# Patient Record
Sex: MALE | Race: OTHER | URBAN - METROPOLITAN AREA
[De-identification: names, ages, dates, MRNs, and addresses within clinical notes are randomized per-mention and may not be internally consistent; named-entity substitution may affect disease eponyms.]

---

## 2024-06-14 ENCOUNTER — APPOINTMENT (OUTPATIENT)
Dept: OTHER | Facility: HOSPITAL | Age: 29
End: 2024-06-14
Attending: EMERGENCY MEDICINE

## 2024-06-14 ENCOUNTER — OFFICE VISIT (OUTPATIENT)
Dept: OTHER | Facility: HOSPITAL | Age: 29
End: 2024-06-14
Attending: EMERGENCY MEDICINE

## 2024-06-21 ENCOUNTER — OFFICE VISIT (OUTPATIENT)
Dept: OTHER | Facility: HOSPITAL | Age: 29
End: 2024-06-21
Attending: EMERGENCY MEDICINE

## 2024-06-21 DIAGNOSIS — R52 PAIN: Primary | ICD-10-CM

## 2024-06-26 ENCOUNTER — TELEPHONE (OUTPATIENT)
Dept: PHYSICAL THERAPY | Facility: HOSPITAL | Age: 29
End: 2024-06-26

## 2024-06-27 ENCOUNTER — APPOINTMENT (OUTPATIENT)
Dept: PHYSICAL THERAPY | Age: 29
End: 2024-06-27
Attending: EMERGENCY MEDICINE
Payer: OTHER MISCELLANEOUS

## 2024-06-27 ENCOUNTER — TELEPHONE (OUTPATIENT)
Dept: PHYSICAL THERAPY | Facility: HOSPITAL | Age: 29
End: 2024-06-27

## 2024-07-08 ENCOUNTER — OFFICE VISIT (OUTPATIENT)
Dept: PHYSICAL THERAPY | Age: 29
End: 2024-07-08
Attending: EMERGENCY MEDICINE
Payer: OTHER MISCELLANEOUS

## 2024-07-08 DIAGNOSIS — R52 PAIN: Primary | ICD-10-CM

## 2024-07-08 PROCEDURE — 97161 PT EVAL LOW COMPLEX 20 MIN: CPT

## 2024-07-08 PROCEDURE — 97014 ELECTRIC STIMULATION THERAPY: CPT

## 2024-07-08 PROCEDURE — 97110 THERAPEUTIC EXERCISES: CPT

## 2024-07-08 NOTE — PROGRESS NOTES
PHYSICAL THERAPY EVALUATION:   Referring Physician: Dr. Holden  Diagnosis:  Pain (R52)       Low back pain  Date of Service: 7/8/2024   Date of Order: 6/20/24    Patient verbally consented to be seen for PT evaluation and treatment  Precautions:  None    PATIENT SUMMARY    Adriana Holbrook is a 29 year old male who presents to therapy today with complaints pain on the R side low back . Pain is constant but worst with bending, sitting with cross legged.Pt describes pain level current 5/10, at best 3/10, at worst 6-7/10. . Pt states that pain is better with sleeping.pain is stabbing nature :    Current functional limitations include reported pain and difficulty with :hygiene in the bathroom: twisting, bending, sitting cross legged, not lifting anything, swimming    History of current condition/ Previous episodes/treatments and results: Pt relates he injured himself at work he injured himself at work when he was lifting a box of fish. Then he felt the pain immediately on the R side and tried to work and complete shift  but the pain remained until the next day. So he informed his supervisor went to see MD and was given medication, getting better each day. No previous issues       Adriana describes prior level of function pt was pain free and is able to play basketball, .     Social  History/Occupation: : working FT and physical work, not working at this time    Pt goals include pt wishes to get exercises to help him , no pain.    Past medical history was reviewed with Adriana.   Significant findings include No past medical history on file.          ASSESSMENT  Adriana presents to physical therapy evaluation with primary c/o pain on the R side low back. Adriana presented with no co-morbidities .    Adriana reports functional deficits include but are not limited to pain and difficulty with :hygiene in the bathroom: twisting, bending, sitting cross legged, not lifting anything, swimming.      The results of the objective  tests and measures as noted below show  impairments of  joint mobility, motor function, muscle performance, muscle endurance, range of motion, coordination, and balance significantly affecting :lumbopelvic  Pt has decreased lumbar mobility with flexion direction  preference noted with evaluation.       Ujwal  has impaired posture.    Ujwal has no noted gait deviations and no reported  compensation when managing stairs. Transitional transfers are not affected by pain .   Pt is positive  for tenderness/hypertonicity  noted on paraspinals area  upon palpation .Hamstring flexibility decreased on B LE.   Pt noted to have impaired  proprioception per SLS on 30 secs but pain on RLE .     Pt and PT discussed evaluation findings, pathology, POC and HEP.    Pt voiced understanding and performs HEP correctly without reported pain.  Instructions were provided on how to modify as need or when to stop.    Skilled Physical Therapy is medically necessary to address the above impairments and reach functional goals to go back to prior level of function with minimal to no compensation, with improved ability to manage and decrease symptoms and pain when performing daily/work and community tasks.      OBJECTIVE:   Observation/Transfers I  Posture:slouched posture  Gait:  pt ambulates on level ground with normal mechanics  Stairs:normal  Palpation: 2/4 tenderness on R LS area      ROM:(*)  pain  in flexion planes  % loss as noted:   With limited lumbar  flexion 25%,   Lumbar extension :25%  B lateral flexion :0%  B rotation :0 %         Sustained motions:  Sustained slouch:NE  Sustained overcorrect:NE  Pronelying:NE  DEJAN:pain then increased pain with repeated DEJAN  SKTC:better  EIL:midrange NT     right left Comments    Motor Control Motor Control    Double Leg Squat WNL WNL    Single Leg Squat Decreased Decreased    Single Leg Balance 30 sec 30 sec Painful on the R side     FLEXIBILITY: Supine 90/90 test for hamstring R:min loss versus  L:min loss    LE ROM AND STRENGTH:   BLE major joints of hip/knee and ankle are WFL, pain free       MMT for   B hip muscles grossly graded  5/5 in major muscles  B knee and ankle DF grossly 5/5    Today’s Treatment and Response:   .Discussion proper/upright posturing and how to decrease symptoms/centralize symptoms     SKTC 20 secs x5  Diaphragmatic breathing  Tra facilitation   TRA marches  Tra with bridges  6. Tra with hip abd RTB x10  7, supine hip adduction  5 secs x10    Pt education was provided on exam findings, treatment diagnosis, treatment plan, expectations, and prognosis. Pt was also provided recommendations for activity modifications, possible soreness after evaluation, and IFC rationale .  Patient was instructed in and issued a HEP for: 1-7    Charges: PT Eval Low Complexity, thereax x1 (10 mins), IFC x1      Total Timed Treatment: 10 min     Total Treatment Time: 55 min     Based on clinical rationale and outcome measures, this evaluation involved Low Complexity decision making due to 1-2 personal factors/comorbidities, 0 body structures involved/activity limitations, and evolving symptoms including changing pain levels.    PLAN OF CARE:    Goals: to be met in 10-12 visits then will reassess     Pt will be I with HEP,its progression and management of symptoms and be I with self correction of upright posturing to continue with gains in therapy.   Pt will demonstrate improved AROM in all planes of lumbar motion to WFL, pain and symptoms free to be able lift with no issues   Pt will report overall decreased in pain and symptoms and functional limitations  by 50% or better to be able to PLOF  Pt will demonstrate good understanding of proper posture and body mechanics to decrease pain and improve spinal safety     Pt will have decreased mm tension/hypertonicity on R LS to minimal to none in order to safely go back to work   Pt will demonstrate biomechanical strategies for functional transfers, floor to  waist lifting etc to minimize risk of injuries:75% of the time with minimal cues.    Frequency / Duration: Patient will be seen for 1-2 x/week or a total of 10-12 visits over a 90 day period. Frequency may be changed as appropriate  Treatment plan includes:  Manual Therapy; Therapeutic Exercises; Neuromuscular Re-education; Therapeutic Activity; Gait Training; Patient education; Home exercise program instruction,self care home management,  Modalities as needed:Electrical stimulation (unattended) and Ultrasound    Education or treatment limitation: None  Rehab Potential:good     OSWESTRY: Oswestry Disability Index Score  Score: 24 % (7/8/2024  1:25 PM)       Patient/Family/Caregiver was advised of these findings, precautions, and treatment options and has agreed to actively participate in planning and for this course of care.    Thank you for your referral. Please co-sign or sign and return this letter via fax as soon as possible to [phone]. If you have any questions, please contact me at Dept: [phone]    Sincerely,  Electronically signed by therapist: Gardenia Moyer,PT,DPT,CAPP-OB  Physician's certification required: Yes  I certify the need for these services furnished under this plan of treatment and while under my care.    ___________________________________________________ Date____________________    Certification From: 7/8/2024  To:10/6/2024

## 2024-07-10 ENCOUNTER — APPOINTMENT (OUTPATIENT)
Dept: OTHER | Facility: HOSPITAL | Age: 29
End: 2024-07-10
Attending: EMERGENCY MEDICINE

## 2024-07-10 ENCOUNTER — OFFICE VISIT (OUTPATIENT)
Dept: PHYSICAL THERAPY | Age: 29
End: 2024-07-10
Attending: EMERGENCY MEDICINE
Payer: OTHER MISCELLANEOUS

## 2024-07-10 PROCEDURE — 97112 NEUROMUSCULAR REEDUCATION: CPT

## 2024-07-10 PROCEDURE — 97110 THERAPEUTIC EXERCISES: CPT

## 2024-07-10 PROCEDURE — 97014 ELECTRIC STIMULATION THERAPY: CPT

## 2024-07-10 NOTE — PROGRESS NOTES
Dx: Pain (R52)       Low back pain            Insurance   work man's comp           Authorized  # visits by insurance  :  12    Expiration date  of Authorization:10/6/24    Eval date/latest PN:7/8/24  Initial POC# of visits: 12                POC cert : 10/6/24    Authorizing Physician: Dr. Holden    Fall Risk: standard         Precautions: none         Subjective: Pt states that he is the same. Pt states that he went to see MD and gasperw could only work with restrictsions  PAIN LEVEL:2/10  Assessment:      PT continued with challenging pt in performing exercises in  different positions / amount of resistance applied/ within new ROM gains  in order to work towards goals      PT continued with IFC due to reported decreased in pain level.     Additional HEP given to continue with progress gained in therapy. Tactile,verbal and written cues given for proper performance. Pt voiced understanding and performs HEP which  correctly without reported pain.Instructions were provided on how to modify as need or when to stop.      See updated objective reports noted below,        See exercises as logged below     Objective:     ROM:(*)  pain  in flexion planes  % loss as noted:               With limited lumbar  flexion 25%,   Lumbar extension :25%  B lateral flexion :0%  B rotation :0 %    Goals:   goals addressed this day as noted above    Goals: to be met in 10-12 visits then will reassess      Pt will be I with HEP,its progression and management of symptoms and be I with self correction of upright posturing to continue with gains in therapy.   Pt will demonstrate improved AROM in all planes of lumbar motion to WFL, pain and symptoms free to be able lift with no issues   Pt will report overall decreased in pain and symptoms and functional limitations  by 50% or better to be able to PLOF  Pt will demonstrate good understanding of proper posture and body mechanics to decrease pain and improve spinal safety      Pt will have  decreased mm tension/hypertonicity on R LS to minimal to none in order to safely go back to work   Pt will demonstrate biomechanical strategies for functional transfers, floor to waist lifting etc to minimize risk of injuries:75% of the time with minimal cues.     Plan: cont per POC     Date: 7/10/24  TX#: 2/12 Date:              TX#: 3/ Date:        TX#: 4/ Date:               TX#: 5/   Date:   Tx#: 6/   Theraex: 30  NU step level x6 mins level 5  DEJAN x2 mins  DEJAN 2x10  Prone hip extension 2x10  SLR 2x10  SL hip abd 2x10  SL clams 2x10  NWB lumbar rotation x10 secs x10       Manual:        Gait/NMRed: 10  mins  Step up 2x10 forward and sideways 6 inches    SLS for load transfers 10 secs x5       Modalities IFC:R LS area x15 mins with ice 1-150hz         HEP see patient instructions tab if with new HEP see patient instructions tab if with new HEP see patient instructions tab if with new HEP see patient instructions tab if with new HEP see patient instructions tab if with new HEP          Charges: thereaex 2 , neuro pati x1  ,IFC x1      Total Timed Treatment:  38min  Total Treatment Time: 53 min

## 2024-07-17 ENCOUNTER — TELEPHONE (OUTPATIENT)
Dept: PHYSICAL THERAPY | Facility: HOSPITAL | Age: 29
End: 2024-07-17

## 2024-07-19 NOTE — PROGRESS NOTES
Dx: Pain (R52)       Low back pain            Insurance   work man's comp           Authorized  # visits by insurance  :  12    Expiration date  of Authorization:10/6/24    Eval date/latest PN:7/8/24  Initial POC# of visits: 12                POC cert : 10/6/24    Authorizing Physician: Dr. Holden    Fall Risk: standard         Precautions: none         Subjective: pt states that he is better, He is back by 99% , worst pain when he sits after 5 mins.Pt states that he is not back to work. He will see MD and follow advise  PAIN LEVEL:0/10  Assessment:      PT continued with challenging pt in performing exercises in  different positions / amount of resistance applied/ within new ROM gains  in order to work towards goals : loaded pt this day with resisted standing, some soreness noted on B hip mm     PT continued with IFC due to reported decreased in pain level.     See updated objective reports noted below,        See exercises as logged below     Objective:     ROM:(*)  pain  in flexion planes  % loss as noted:               With limited lumbar  flexion 25%,   Lumbar extension :25%  B lateral flexion :0%  B rotation :0 %    Goals:   goals addressed this day as noted above    Goals: to be met in 10-12 visits then will reassess      Pt will be I with HEP,its progression and management of symptoms and be I with self correction of upright posturing to continue with gains in therapy.: reports compliance   Pt will demonstrate improved AROM in all planes of lumbar motion to WFL, pain and symptoms free to be able lift with no issues:improved   Pt will report overall decreased in pain and symptoms and functional limitations  by 50% or better to be able to PLOF: improved: 99% PLOF to this date  Pt will demonstrate good understanding of proper posture and body mechanics to decrease pain and improve spinal safety : in progress     Pt will have decreased mm tension/hypertonicity on R LS to minimal to none in order to safely go back  to work : improved  Pt will demonstrate biomechanical strategies for functional transfers, floor to waist lifting etc to minimize risk of injuries:75% of the time with minimal cues.     Plan: cont per POC but does not want to schedule more at this time and      Date: 7/10/24  TX#: 2/12 Date:      7/23/24  TX#: 3/ Date:        TX#: 4/ Date:               TX#: 5/   Date:   Tx#: 6/   Theraex: 30  NU step level x6 mins level 5  DEJAN x2 mins  DEJAN 2x10  Prone hip extension 2x10  SLR 2x10  SL hip abd 2x10  SL clams 2x10  NWB lumbar rotation x10 secs x10 X38 mins  NU step level x6 mins level 5   Lumbar extension 2x10  Standing hip exercises x10 x3 planes with no resistance then with YTB x10    Shuttle BLE 4 bands 2x10  \shuttle R/ LLE 2 [id number]  NWB lumbar rotation x10 secs x10  Bridges 5 x15      Manual:        Gait/NMRed:        Modalities IFC:R LS area x15 mins with ice 1-150hz   IFC:R LS area x15 mins with ice 1-150hz      HEP see patient instructions tab if with new HEP see patient instructions tab if with new HEP   See tab see patient instructions tab if with new HEP see patient instructions tab if with new HEP see patient instructions tab if with new HEP          Charges: thereaex 3   ,IFC x1      Total Timed Treatment:  38min  Total Treatment Time: 53 min

## 2024-07-23 ENCOUNTER — OFFICE VISIT (OUTPATIENT)
Dept: PHYSICAL THERAPY | Age: 29
End: 2024-07-23
Attending: EMERGENCY MEDICINE
Payer: OTHER MISCELLANEOUS

## 2024-07-23 PROCEDURE — 97014 ELECTRIC STIMULATION THERAPY: CPT

## 2024-07-23 PROCEDURE — 97110 THERAPEUTIC EXERCISES: CPT

## 2024-07-24 ENCOUNTER — OFFICE VISIT (OUTPATIENT)
Dept: PHYSICAL MEDICINE AND REHAB | Facility: CLINIC | Age: 29
End: 2024-07-24
Payer: OTHER MISCELLANEOUS

## 2024-07-24 ENCOUNTER — HOSPITAL ENCOUNTER (OUTPATIENT)
Dept: GENERAL RADIOLOGY | Facility: HOSPITAL | Age: 29
Discharge: HOME | End: 2024-07-24
Attending: PHYSICAL MEDICINE & REHABILITATION
Payer: OTHER MISCELLANEOUS

## 2024-07-24 VITALS — BODY MASS INDEX: 22.44 KG/M2 | HEIGHT: 67 IN | WEIGHT: 143 LBS

## 2024-07-24 DIAGNOSIS — M47.816 LUMBAR FACET JOINT SYNDROME: ICD-10-CM

## 2024-07-24 DIAGNOSIS — M47.816 LUMBAR FACET JOINT SYNDROME: Primary | ICD-10-CM

## 2024-07-24 PROCEDURE — 72114 X-RAY EXAM L-S SPINE BENDING: CPT | Performed by: PHYSICAL MEDICINE & REHABILITATION

## 2024-07-24 PROCEDURE — 99204 OFFICE O/P NEW MOD 45 MIN: CPT | Performed by: PHYSICAL MEDICINE & REHABILITATION

## 2024-07-24 NOTE — PATIENT INSTRUCTIONS
Return back to work full duty no restrictions  X-ray of the lumbar spine on the way out today  Meloxicam 15 mg daily for the next 7 to 10 days  Finish PT and continue home exercises  Topical treatment as tolerated  Follow-up in 4-week

## 2024-07-24 NOTE — PROGRESS NOTES
[facility]  NEW PATIENT EVALUATION    Consultation as a request of Dr. Purvis ref. provider found      HISTORY OF PRESENT ILLNESS:     Chief Complaint   Patient presents with    New Patient     New left hand dominant patient presents for low back pain. Patient states he lifted a heavy tuna fish at work and felt pain instantly. Pain has been present for 1 month. Pain 2/10. Admits N/T on the right low back. Denies weakness. Patient is in PT with good relief. No pain meds. No recent imaging.        The patient is a 29 year old male with no significant past medical history who presents with back pain.  States he had a work-related injury closer to June 25th but cannot recall the exact date.  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  He was lifting something when he felt a sharp pain.  States the pain was bad enough that he could not work.  He was seen in occupational health and has been doing physical therapy for the last month.  He has noted good improvement in his pain.  States he can stand and walk and ambulate without any significant discomfort however if he tries to lift something heavy he does feel some pain and numbness in the right-sided low back.  No pain radiating down the leg, no numbness tingling, no weakness, no bowel bladder issues patient states he was trying to work with some restrictions but needed to be at full duty in order to return back.    PHYSICAL EXAM:   Ht 67\"   Wt 143 lb (64.9 kg)   BMI 22.40 kg/m²     Gait  Able to toe walk and heel walk without any difficulty    LUMBAR SPINE:  Inspection: no erythema, swelling, or obvious deformity.  Their iliac crest and shoulder heights are symmetrical.     Palpation: Non tender to palpation of the spinous process. T palpation of the right lower lumbar PSIS and paraspinal muscle  ROM: FAROM  Strength: 5/5 in bilateral lower extremities  Sensation: Intact to light touch in all  dermatomes of the lower extremities  Reflexes: 2/4 at L4 and S1  Facet Loading: no specific facet pain  Straight leg raise: negative for radicular pain symptoms  Slump test: negative for pain symptoms for radicular pain symptoms      IMAGING:     None    All imaging results were reviewed and discussed with patient.      ASSESSMENT/PLAN:     1. Lumbar facet joint syndrome        Adriana Holbrook is a pleasant 29-year-old male presenting today for evaluation of right-sided low back pain which I believe he may have some stress response either in the pedicle or the facet joint may even have possibly a pars stress injury.  I recommended x-ray imaging to rule this out.  Recommend that he return back to work full duty no restrictions as his pain is improved greatly and he has a good neurological exam today.  Recommend he use meloxicam 15 mg daily for the next 7 to 10 days and to complete his physical therapy program and continue home exercises.  He will follow-up with me in 4 weeks in the office if pain is no better we will discuss further treatment options.      The patient verbalized understanding with the plan and was in agreement. All questions/concerns were addressed and there were no barriers to learning.  Please note Dragon dictation software was used to dictate this note and may result in inadvertent typos.    Maria Del Carmen Russell DO, FAAPMR & CAQSM  Physical Medicine and Rehabilitation  Sports and Spine Medicine    PAST MEDICAL HISTORY:   No past medical history on file.      PAST SURGICAL HISTORY:   No past surgical history on file.      CURRENT MEDICATIONS:     No current outpatient medications on file.         ALLERGIES:   No Known Allergies      FAMILY HISTORY:   No family history on file.       SOCIAL HISTORY:     Social History     Socioeconomic History    Marital status: Single          REVIEW OF SYSTEMS:   No patient-reported data collected this visit.      PHYSICAL EXAM:   General: No immediate distress  Head:  Normocephalic/ Atraumatic  Eyes: Extra-occular movements intact.   Ears: No auricular hematoma or deformities  Mouth: No lesions or ulcerations  Heart: peripheral pulses intact. Normal capillary refill.   Lungs: Non-labored respirations  Abdomen: No abdominal guarding  Extremities: No lower extremity edema bilaterally   Skin: No lesions noted   Cognition: alert & oriented x 3, attentive, able to follow 2 step commands, comprehention intact, spontaneous speech intact  Psychiatric: Mood and affect appropriate      LABS:   No results found for: \"EAG\", \"A1C\"  No results found for: \"WBC\", \"RBC\", \"HGB\", \"HCT\", \"MCV\", \"MCH\", \"MCHC\", \"RDW\", \"PLT\", \"MPV\"  No results found for: \"GLU\", \"BUN\", \"BUNCREA\", \"CREATSERUM\", \"ANIONGAP\", \"GFR\", \"GFRNAA\", \"GFRAA\", \"CA\", \"OSMOCALC\", \"ALKPHO\", \"AST\", \"ALT\", \"ALKPHOS\", \"BILT\", \"TP\", \"ALB\", \"GLOBULIN\", \"AGRATIO\", \"NA\", \"K\", \"CL\", \"CO2\"  No results found for: \"PTP\", \"PT\", \"INR\"  No results found for: \"VITD\", \"QVITD\", \"[id number]\"

## 2024-07-25 NOTE — PROGRESS NOTES
Dx: Pain (R52)       Low back pain            Insurance   work man's comp           Authorized  # visits by insurance  :  12    Expiration date  of Authorization:10/6/24    Eval date/latest PN:7/8/24  Initial POC# of visits: 12                POC cert : 10/6/24    Authorizing Physician: Dr. Holden    Fall Risk: standard         Precautions: none         Subjective: pt states that he is better, He is back by 99% , worst pain when he sits after 5 mins.Pt states that he is not back to work. He will see MD and follow advise  PAIN LEVEL:0/10  Assessment:      PT continued with challenging pt in performing exercises in  different positions / amount of resistance applied/ within new ROM gains  in order to work towards goals : loaded pt this day with resisted standing, some soreness noted on B hip mm     PT continued with IFC due to reported decreased in pain level.     See updated objective reports noted below,        See exercises as logged below     Objective:     ROM:(*)  pain  in flexion planes  % loss as noted:               With limited lumbar  flexion 25%,   Lumbar extension :25%  B lateral flexion :0%  B rotation :0 %    Goals:   goals addressed this day as noted above    Goals: to be met in 10-12 visits then will reassess      Pt will be I with HEP,its progression and management of symptoms and be I with self correction of upright posturing to continue with gains in therapy.: reports compliance   Pt will demonstrate improved AROM in all planes of lumbar motion to WFL, pain and symptoms free to be able lift with no issues:improved   Pt will report overall decreased in pain and symptoms and functional limitations  by 50% or better to be able to PLOF: improved: 99% PLOF to this date  Pt will demonstrate good understanding of proper posture and body mechanics to decrease pain and improve spinal safety : in progress     Pt will have decreased mm tension/hypertonicity on R LS to minimal to none in order to safely go back  to work : improved  Pt will demonstrate biomechanical strategies for functional transfers, floor to waist lifting etc to minimize risk of injuries:75% of the time with minimal cues.     Plan: cont per POC but does not want to schedule more at this time and      Date: 7/10/24  TX#: 2/12 Date:      7/23/24  TX#: 3/ Date:        TX#: 4/ Date:               TX#: 5/   Date:   Tx#: 6/   Theraex: 30  NU step level x6 mins level 5  DEJAN x2 mins  DEJAN 2x10  Prone hip extension 2x10  SLR 2x10  SL hip abd 2x10  SL clams 2x10  NWB lumbar rotation x10 secs x10 X38 mins  NU step level x6 mins level 5   Lumbar extension 2x10  Standing hip exercises x10 x3 planes with no resistance then with YTB x10    Shuttle BLE 4 bands 2x10  \shuttle R/ LLE 2 [id number]  NWB lumbar rotation x10 secs x10  Bridges 5 x15      Manual:        Gait/NMRed:        Modalities IFC:R LS area x15 mins with ice 1-150hz   IFC:R LS area x15 mins with ice 1-150hz      HEP see patient instructions tab if with new HEP see patient instructions tab if with new HEP   See tab see patient instructions tab if with new HEP see patient instructions tab if with new HEP see patient instructions tab if with new HEP          Charges: thereaex 3   ,IFC x1      Total Timed Treatment:  38min  Total Treatment Time: 53 min

## 2024-07-26 ENCOUNTER — TELEPHONE (OUTPATIENT)
Dept: PHYSICAL THERAPY | Facility: HOSPITAL | Age: 29
End: 2024-07-26

## 2024-07-26 ENCOUNTER — APPOINTMENT (OUTPATIENT)
Dept: PHYSICAL THERAPY | Age: 29
End: 2024-07-26
Attending: EMERGENCY MEDICINE
Payer: OTHER MISCELLANEOUS

## 2024-07-29 ENCOUNTER — APPOINTMENT (OUTPATIENT)
Dept: PHYSICAL THERAPY | Age: 29
End: 2024-07-29
Attending: EMERGENCY MEDICINE
Payer: OTHER MISCELLANEOUS

## 2024-07-30 ENCOUNTER — TELEPHONE (OUTPATIENT)
Dept: PHYSICAL MEDICINE AND REHAB | Facility: CLINIC | Age: 29
End: 2024-07-30

## 2024-07-30 NOTE — TELEPHONE ENCOUNTER
I called patient 2 times to try and help schedule his F/U around 8/21/24 however there is no answer and no machine.    PSR- Its okay to schedule his f/u appt with Dr Russell. See documents for auth

## 2024-07-31 ENCOUNTER — APPOINTMENT (OUTPATIENT)
Dept: PHYSICAL THERAPY | Age: 29
End: 2024-07-31
Attending: EMERGENCY MEDICINE
Payer: OTHER MISCELLANEOUS

## 2024-07-31 ENCOUNTER — TELEPHONE (OUTPATIENT)
Dept: PHYSICAL MEDICINE AND REHAB | Facility: CLINIC | Age: 29
End: 2024-07-31

## 2025-04-25 NOTE — TELEPHONE ENCOUNTER
Faxed ov notes from 7/24 to Hussain Simmons at [employer], fax # [phone].    Let patient know I reviewed the following blood work:    BMP unremarkable except for glucose of 107, his hyperkalemia resolved to 4.4.  Previously was 5.4.  No further action needed at this time.    CBC with differential shows H&H 9.5 31.8 with an elevated platelet count of 766 and a white count normal 11.2 differential shows slightly elevated eosinophils.  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    Ferritin level 1,085 back.  Serum iron 140, TIBC 303, and iron saturation 46.

## (undated) NOTE — LETTER
24          Adriana Holbrook  :  1995      To Whom It May Concern:    This patient was seen in our office on 24 .  Work status:  May return to work full-time, no restrictions    May return to work status per above effective .    If this office may be of further assistance, please do not hesitate to contact us.      Sincerely,  Maria Del Carmen Russell, DO